# Patient Record
Sex: FEMALE | URBAN - METROPOLITAN AREA
[De-identification: names, ages, dates, MRNs, and addresses within clinical notes are randomized per-mention and may not be internally consistent; named-entity substitution may affect disease eponyms.]

---

## 2018-09-06 ENCOUNTER — APPOINTMENT (OUTPATIENT)
Age: 43
Setting detail: DERMATOLOGY
End: 2018-09-06

## 2018-09-06 DIAGNOSIS — L71.0 PERIORAL DERMATITIS: ICD-10-CM

## 2018-09-06 DIAGNOSIS — L72.0 EPIDERMAL CYST: ICD-10-CM

## 2018-09-06 PROCEDURE — OTHER COUNSELING: OTHER

## 2018-09-06 PROCEDURE — OTHER PRESCRIPTION: OTHER

## 2018-09-06 PROCEDURE — 99202 OFFICE O/P NEW SF 15 MIN: CPT

## 2018-09-06 PROCEDURE — OTHER PRESCRIPTION MEDICATION MANAGEMENT: OTHER

## 2018-09-06 PROCEDURE — OTHER EDUCATIONAL RESOURCES PROVIDED: OTHER

## 2018-09-06 RX ORDER — METRONIDAZOLE 7.5 MG/G
CREAM TOPICAL QHS
Qty: 45 | Refills: 1 | Status: ERX | COMMUNITY
Start: 2018-09-06

## 2018-09-06 RX ORDER — MINOCYCLINE HYDROCHLORIDE 80 MG/1
TABLET, FILM COATED, EXTENDED RELEASE ORAL QD
Qty: 30 | Refills: 1 | Status: ERX | COMMUNITY
Start: 2018-09-06

## 2018-09-06 ASSESSMENT — LOCATION DETAILED DESCRIPTION DERM
LOCATION DETAILED: LEFT MEDIAL BUCCAL CHEEK
LOCATION DETAILED: RIGHT MEDIAL BUCCAL CHEEK
LOCATION DETAILED: LEFT MEDIAL FOREHEAD

## 2018-09-06 ASSESSMENT — LOCATION SIMPLE DESCRIPTION DERM
LOCATION SIMPLE: RIGHT CHEEK
LOCATION SIMPLE: LEFT CHEEK
LOCATION SIMPLE: LEFT FOREHEAD

## 2018-09-06 ASSESSMENT — LOCATION ZONE DERM: LOCATION ZONE: FACE

## 2018-09-06 NOTE — HPI: CYST (MILIA)
How Severe Is It?: mild
Is This A New Presentation, Or A Follow-Up?: Cysts
Additional History: Pt has recently d/c the use of Tretinoin and now notice new lesions forming on the Forehead.

## 2018-09-06 NOTE — PROCEDURE: PRESCRIPTION MEDICATION MANAGEMENT
Initiate Treatment: Metronidazole 0.75% Cream- QHS \\nSolodyn 80mg- QD
Plan: Tretinoin will not be restarted until Perioral dermatitis is under control
Samples Given: Solodyn 80mg
Detail Level: Zone
Continue Regimen: Tretinoin
Samples Given: Retin A Micro 0.06%

## 2018-09-06 NOTE — HPI: RASH
How Severe Is Your Rash?: mild
Is This A New Presentation, Or A Follow-Up?: Rash
Additional History: Pt states rash has not gone away on its own. Pt reports she has not changed usual products or tried anything new.

## 2018-09-13 ENCOUNTER — RX ONLY (RX ONLY)
Age: 43
End: 2018-09-13

## 2018-09-13 RX ORDER — DOXYCYCLINE HYCLATE 100 MG/1
CAPSULE, GELATIN COATED ORAL BID
Qty: 60 | Refills: 2 | Status: ERX | COMMUNITY
Start: 2018-09-13

## 2019-03-21 ENCOUNTER — APPOINTMENT (OUTPATIENT)
Age: 44
Setting detail: DERMATOLOGY
End: 2019-03-21

## 2019-03-21 DIAGNOSIS — L71.0 PERIORAL DERMATITIS: ICD-10-CM

## 2019-03-21 PROCEDURE — OTHER PRESCRIPTION: OTHER

## 2019-03-21 PROCEDURE — OTHER PRESCRIPTION MEDICATION MANAGEMENT: OTHER

## 2019-03-21 PROCEDURE — OTHER COUNSELING: OTHER

## 2019-03-21 PROCEDURE — 99213 OFFICE O/P EST LOW 20 MIN: CPT

## 2019-03-21 RX ORDER — IVERMECTIN 10 MG/G
CREAM TOPICAL QD
Qty: 30 | Refills: 3 | Status: ERX | COMMUNITY
Start: 2019-03-21

## 2019-03-21 RX ORDER — DOXYCYCLINE HYCLATE 100 MG/1
CAPSULE, GELATIN COATED ORAL BID
Qty: 60 | Refills: 1 | Status: ERX | COMMUNITY
Start: 2019-03-21

## 2019-03-21 ASSESSMENT — LOCATION DETAILED DESCRIPTION DERM
LOCATION DETAILED: RIGHT MEDIAL BUCCAL CHEEK
LOCATION DETAILED: LEFT MEDIAL BUCCAL CHEEK

## 2019-03-21 ASSESSMENT — LOCATION SIMPLE DESCRIPTION DERM
LOCATION SIMPLE: LEFT CHEEK
LOCATION SIMPLE: RIGHT CHEEK

## 2019-03-21 ASSESSMENT — LOCATION ZONE DERM: LOCATION ZONE: FACE

## 2019-03-21 NOTE — PROCEDURE: PRESCRIPTION MEDICATION MANAGEMENT
Detail Level: Zone
Continue Regimen: Doxycycline 100mg, BID for 2 months
Discontinue Regimen: Metronidazole 0.75% Cream- QHS \\nSolodyn 80mg- QD
Initiate Treatment: Soolantra, QAM

## 2022-11-29 ENCOUNTER — APPOINTMENT (OUTPATIENT)
Dept: URBAN - METROPOLITAN AREA CLINIC 314 | Age: 47
Setting detail: DERMATOLOGY
End: 2022-11-29

## 2022-11-29 DIAGNOSIS — L71.0 PERIORAL DERMATITIS: ICD-10-CM

## 2022-11-29 PROCEDURE — OTHER PRESCRIPTION: OTHER

## 2022-11-29 PROCEDURE — OTHER EDUCATIONAL RESOURCES PROVIDED: OTHER

## 2022-11-29 PROCEDURE — OTHER PRESCRIPTION MEDICATION MANAGEMENT: OTHER

## 2022-11-29 PROCEDURE — OTHER TREATMENT REGIMEN: OTHER

## 2022-11-29 PROCEDURE — OTHER COUNSELING: OTHER

## 2022-11-29 PROCEDURE — 99203 OFFICE O/P NEW LOW 30 MIN: CPT

## 2022-11-29 PROCEDURE — OTHER MIPS QUALITY: OTHER

## 2022-11-29 RX ORDER — TACROLIMUS 1 MG/G
OINTMENT TOPICAL BID
Qty: 30 | Refills: 0 | Status: ERX | COMMUNITY
Start: 2022-11-29

## 2022-11-29 ASSESSMENT — LOCATION DETAILED DESCRIPTION DERM
LOCATION DETAILED: PHILTRUM
LOCATION DETAILED: RIGHT UPPER CUTANEOUS LIP
LOCATION DETAILED: LEFT ORAL COMMISSURE

## 2022-11-29 ASSESSMENT — LOCATION SIMPLE DESCRIPTION DERM
LOCATION SIMPLE: UPPER LIP
LOCATION SIMPLE: RIGHT LIP
LOCATION SIMPLE: LEFT LIP

## 2022-11-29 ASSESSMENT — LOCATION ZONE DERM: LOCATION ZONE: LIP

## 2022-11-29 NOTE — PROCEDURE: COUNSELING
Detail Level: Simple
Patient Specific Counseling (Will Not Stick From Patient To Patient): \\nI suspect this is due to a contact allergen rather than classic perioral dermatitis.

## 2022-11-29 NOTE — PROCEDURE: PRESCRIPTION MEDICATION MANAGEMENT
Initiate Treatment: Tacrolimus 0.1% Ointment BID
Detail Level: Zone
Render In Strict Bullet Format?: No

## 2022-11-29 NOTE — HPI: RASH
How Severe Is Your Rash?: moderate
Is This A New Presentation, Or A Follow-Up?: Rash
Additional History: The patient has previously been treated for Perioral Dermatitis, and believes she is having a recurrence. She was recently prescribed Metronidazole and Doxycycline. She states that the condition has improved some but it is still active. \\nShe also applies coconut oil on the affected area. \\n\\nShe has one pill of the antibiotic left, she has been on the medication for one month.

## 2023-01-24 ENCOUNTER — APPOINTMENT (OUTPATIENT)
Dept: URBAN - METROPOLITAN AREA CLINIC 314 | Age: 48
Setting detail: DERMATOLOGY
End: 2023-01-31

## 2023-01-24 DIAGNOSIS — L71.0 PERIORAL DERMATITIS: ICD-10-CM

## 2023-01-24 DIAGNOSIS — L72.0 EPIDERMAL CYST: ICD-10-CM

## 2023-01-24 PROCEDURE — 99213 OFFICE O/P EST LOW 20 MIN: CPT

## 2023-01-24 PROCEDURE — OTHER TREATMENT REGIMEN: OTHER

## 2023-01-24 PROCEDURE — OTHER PRESCRIPTION MEDICATION MANAGEMENT: OTHER

## 2023-01-24 PROCEDURE — OTHER COUNSELING: OTHER

## 2023-01-24 ASSESSMENT — LOCATION SIMPLE DESCRIPTION DERM
LOCATION SIMPLE: LEFT LIP
LOCATION SIMPLE: RIGHT LIP
LOCATION SIMPLE: UPPER LIP
LOCATION SIMPLE: LEFT FOREHEAD
LOCATION SIMPLE: RIGHT FOREHEAD

## 2023-01-24 ASSESSMENT — LOCATION DETAILED DESCRIPTION DERM
LOCATION DETAILED: LEFT ORAL COMMISSURE
LOCATION DETAILED: PHILTRUM
LOCATION DETAILED: LEFT FOREHEAD
LOCATION DETAILED: RIGHT FOREHEAD
LOCATION DETAILED: RIGHT UPPER CUTANEOUS LIP

## 2023-01-24 ASSESSMENT — LOCATION ZONE DERM
LOCATION ZONE: FACE
LOCATION ZONE: LIP

## 2023-01-24 NOTE — HPI: RASH (PERIORAL DERMATITIS)
How Severe Is It?: moderate
Is This A New Presentation, Or A Follow-Up?: Follow Up Perioral Dermatitis
Additional History: d Metronidazole and Doxycycline

## 2023-01-24 NOTE — PROCEDURE: PRESCRIPTION MEDICATION MANAGEMENT
Render In Strict Bullet Format?: No
Discontinue Regimen: -\\nTacrolimus 0.1% Ointment BID
Continue Regimen: Elidel 1% topical cream BID
Detail Level: Zone

## 2023-01-24 NOTE — PROCEDURE: COUNSELING
Detail Level: Detailed
Detail Level: Simple
Patient Specific Counseling (Will Not Stick From Patient To Patient): We discussed starting another topical and if dermatitis doesn’t improve we will consider oral antibiotics
Patient Specific Counseling (Will Not Stick From Patient To Patient): We discussed starting with a OTC adapalene. If no improvement we will considered removal